# Patient Record
Sex: FEMALE | URBAN - NONMETROPOLITAN AREA
[De-identification: names, ages, dates, MRNs, and addresses within clinical notes are randomized per-mention and may not be internally consistent; named-entity substitution may affect disease eponyms.]

---

## 2023-06-20 ENCOUNTER — WEB ENCOUNTER (OUTPATIENT)
Dept: URBAN - NONMETROPOLITAN AREA CLINIC 13 | Facility: CLINIC | Age: 58
End: 2023-06-20

## 2023-06-22 ENCOUNTER — LAB OUTSIDE AN ENCOUNTER (OUTPATIENT)
Dept: URBAN - NONMETROPOLITAN AREA CLINIC 13 | Facility: CLINIC | Age: 58
End: 2023-06-22

## 2023-06-22 ENCOUNTER — OFFICE VISIT (OUTPATIENT)
Dept: URBAN - NONMETROPOLITAN AREA CLINIC 13 | Facility: CLINIC | Age: 58
End: 2023-06-22
Payer: COMMERCIAL

## 2023-06-22 VITALS
BODY MASS INDEX: 19.43 KG/M2 | HEIGHT: 68 IN | HEART RATE: 74 BPM | SYSTOLIC BLOOD PRESSURE: 105 MMHG | DIASTOLIC BLOOD PRESSURE: 72 MMHG | WEIGHT: 128.2 LBS

## 2023-06-22 DIAGNOSIS — R10.32 LLQ PAIN: ICD-10-CM

## 2023-06-22 DIAGNOSIS — Z12.11 COLON CANCER SCREENING: ICD-10-CM

## 2023-06-22 PROBLEM — 301716002: Status: ACTIVE | Noted: 2023-06-22

## 2023-06-22 PROBLEM — 305058001: Status: ACTIVE | Noted: 2023-06-22

## 2023-06-22 PROCEDURE — 99204 OFFICE O/P NEW MOD 45 MIN: CPT | Performed by: NURSE PRACTITIONER

## 2023-06-22 RX ORDER — HYOSCYAMINE SULFATE 0.12 MG/1
1 TABLET UNDER THE TONGUE AND ALLOW TO DISSOLVE AS NEEDED TABLET, ORALLY DISINTEGRATING ORAL
Qty: 90 | Refills: 1 | OUTPATIENT
Start: 2023-06-22 | End: 2023-08-21

## 2023-06-22 NOTE — HPI-TODAY'S VISIT:
Ms. Isabell Carrasco is a 58 year old female with PMH of cardiac arrest s/p ICD who presents today for LLQ pain and need for routine colon cancer screening. Isabell has experienced 2 separate episodes of LLQ pain since December, each lasting around 24-36 hrs. The pain comes on suddenly, feels like a birth contraction, and is not associated orchanged with eating or having a bowel movement. She also noticed concurrent vaginal bleeding, so her gynecologist ordered a vaginal u/s which was normal.  Isabell last had a colonoscopy over 15 years ago that was normal. She denies seeing any blood in stool, unintentional weight loss, change in bowel habits, or family history of colon cancer. LG.

## 2023-09-08 ENCOUNTER — OFFICE VISIT (OUTPATIENT)
Dept: URBAN - METROPOLITAN AREA MEDICAL CENTER 1 | Facility: MEDICAL CENTER | Age: 58
End: 2023-09-08

## 2023-11-17 ENCOUNTER — OFFICE VISIT (OUTPATIENT)
Dept: URBAN - METROPOLITAN AREA MEDICAL CENTER 1 | Facility: MEDICAL CENTER | Age: 58
End: 2023-11-17

## 2023-12-14 ENCOUNTER — OFFICE VISIT (OUTPATIENT)
Dept: URBAN - NONMETROPOLITAN AREA CLINIC 13 | Facility: CLINIC | Age: 58
End: 2023-12-14

## 2024-02-16 ENCOUNTER — COLON (OUTPATIENT)
Dept: URBAN - METROPOLITAN AREA MEDICAL CENTER 1 | Facility: MEDICAL CENTER | Age: 59
End: 2024-02-16

## 2024-02-23 ENCOUNTER — COLON (OUTPATIENT)
Dept: URBAN - METROPOLITAN AREA MEDICAL CENTER 1 | Facility: MEDICAL CENTER | Age: 59
End: 2024-02-23

## 2024-03-05 ENCOUNTER — OV EP (OUTPATIENT)
Dept: URBAN - NONMETROPOLITAN AREA CLINIC 13 | Facility: CLINIC | Age: 59
End: 2024-03-05
Payer: COMMERCIAL

## 2024-03-05 ENCOUNTER — LAB (OUTPATIENT)
Dept: URBAN - NONMETROPOLITAN AREA CLINIC 13 | Facility: CLINIC | Age: 59
End: 2024-03-05

## 2024-03-05 VITALS
DIASTOLIC BLOOD PRESSURE: 74 MMHG | HEART RATE: 64 BPM | HEIGHT: 68 IN | SYSTOLIC BLOOD PRESSURE: 105 MMHG | WEIGHT: 131 LBS | BODY MASS INDEX: 19.85 KG/M2

## 2024-03-05 DIAGNOSIS — R10.32 LLQ PAIN: ICD-10-CM

## 2024-03-05 DIAGNOSIS — Z12.11 COLON CANCER SCREENING: ICD-10-CM

## 2024-03-05 PROCEDURE — 99213 OFFICE O/P EST LOW 20 MIN: CPT | Performed by: NURSE PRACTITIONER

## 2024-03-05 RX ORDER — PREDNISONE 1 MG/1
TABLET ORAL
Qty: 270 TABLET | Status: ACTIVE | COMMUNITY

## 2024-03-05 RX ORDER — ADALIMUMAB 40MG/0.8ML
KIT SUBCUTANEOUS
Qty: 2 EACH | Status: ACTIVE | COMMUNITY

## 2024-03-05 RX ORDER — CARVEDILOL 6.25 MG/1
TAKE 1 TABLET BY MOUTH IN THE MORNING AND 1/2 (ONE-HALF) IN THE EVENING WITH MEALS TABLET, FILM COATED ORAL
Qty: 135 EACH | Refills: 0 | Status: ACTIVE | COMMUNITY

## 2024-03-05 NOTE — HPI-OTHER HISTORIES
6/22/2023: Ms. Isabell Carrasco is a 58 year old female with PMH of cardiac arrest s/p ICD who presents today for LLQ pain and need for routine colon cancer screening. Isabell has experienced 2 separate episodes of LLQ pain since December, each lasting around 24-36 hrs. The pain comes on suddenly, feels like a birth contraction, and is not associated orchanged with eating or having a bowel movement. She also noticed concurrent vaginal bleeding, so her gynecologist ordered a vaginal u/s which was normal. Isabell last had a colonoscopy over 15 years ago that was normal. She denies seeing any blood in stool, unintentional weight loss, change in bowel habits, or family history of colon cancer. LG.

## 2024-03-05 NOTE — HPI-TODAY'S VISIT:
Isabell returns for f/u of LLQ pain and colon cancer screening. Colonoscopy showed diverticulosis and was mildly tortuous but otherwise normal. Recommendation to repeat in 10 years. Isabell endorses one episode of LLQ discomfort since her last visit but felt it was mild and self-limiting. She did not use levsin when the pain occurred. Denied n/v/d or hematochezia. Feels well overall. LG.
Normal vision: sees adequately in most situations; can see medication labels, newsprint